# Patient Record
(demographics unavailable — no encounter records)

---

## 2017-12-28 NOTE — PCM.OPNOTE
- General Post-Op/Procedure Note


Date of Surgery/Procedure: 12/28/17


Operative Procedure(s): egd w bx.  colonoscopy w bx


Findings: 





see dict [id number]


Pre Op Diagnosis: hx of colon polyp and gerd


Post-Op Diagnosis: Same


Anesthesia Technique: Moderate Sedation


Primary Surgeon: Jose Cunningham


Pathology: 


egd bx


2 mm sessile polyp X 2, cold bx at icv


Complications: None


Condition: Good

## 2017-12-28 NOTE — OR
SURGEON:

Jose Cunningham MD

 

DATE OF PROCEDURE:  12/28/2017

 

PREOPERATIVE DIAGNOSIS:

Surveillance colonoscopy and acid reflux.

 

POSTOPERATIVE DIAGNOSIS:

Surveillance colonoscopy and acid reflux.

 

PROCEDURE PERFORMED:

1. Esophagogastroduodenoscopy with biopsy.

2. Colonoscopy with biopsy.

 

DESCRIPTION OF PROCEDURE:

EGD: The patient was taken to the endoscopy room, and with the CRNA, Diprivan

was administered.

 

A well-lubricated EGD scope was gently inserted through the oropharynx, down the

esophagus, passing through the gastroesophageal junction, into the stomach.  The

mucosa was examined upon the passage.  Any etiology will be noted.  Once in the

stomach, we continued to advance to the distal antrum, passed through the

pylorus into the second portion of the duodenum.  Again, the mucosa was examined

for any abnormality and etiology.

 

The scope was then retrieved back to the stomach and then retroflexed to look at

the fundus of the stomach.  If a biopsy was indicated, we will biopsy the

antrum, body, and gastroesophageal junction.  The air will be sucked out while

the scope is retrieved to reduce the patient's discomfort.

 

The patient tolerated the procedure well.  There were no intraoperative

complications.  Dr. Cunningham was present through the whole procedure.

 

Prior to surgery, a time-out had been called, the patient identified, procedure

identified and antibiotic administered.

 

Colonoscopy with biopsy:  The patient was taken to the endoscopy room.  A time

out was called, patient identified, and procedure identified.  Diprivan was then

administrated.  Patient went from awake to sleep, hearing doctor talking or door

closing is normal.  Perineum inspection and digital examination were then

performed.  A well-lubricated colonoscope was gently inserted through the

rectum, advanced past the rectosigmoid junction, the descending colon, splenic

flexure, transverse colon, hepatic flexure, ascending colon, arrived to the

cecum.  Cecum was identified as dictated in the finding.  Then the scope was

carefully withdrawn while attention was paid to the mucosal surface for any

abnormality.  Air will be sucked out during the scope withdrawal.  At the

rectum, retroflexed to examine any rectal diseases, fistula or hemorrhoids.

During mucosal examination, abnormality or polyp was noted; picture taken and

biopsy performed.  Patient tolerated procedure well.  There were no

intraoperative complications, and Dr. Cunningham was present throughout the whole

procedure.

 

EGD FINDINGS:

1. The patient was easily sedated with CRNA and Diprivan.  The patient was

    soundly snoring.

2. Oropharynx and proximal esophagus were free of disease and normal in

    appearance and distal esophagus, GE junction at 40 showed significant

    amount of salmon-color change consistent with acid reflux and stomach rugae

    is normal in appearance.  There is no bile, food, blood, or AV malformation

    or ulcer observed.  Antrum is mildly inflamed and duodenum is grossly

    normal in appearance.  Scope was retrieved back to the stomach to look at

    the fundus of stomach, the patient had mild degree hiatal hernia.  Again,

    no blood or ulcer observed on further study.  Biopsy done at antrum, GE

    junction at 40, and body and sucked out the air while scope pulling out.

 

COLONOSCOPY FINDINGS:

1. The patient was easily sedated with CRNA and Diprivan.  The patient was

    soundly snoring.

2. At this time, the patient does not seem to have any vasovagal response like

    last time.

3. Bowel prep is left to be desirable, large amount of opaque liquid stool

    coating the mucosa requiring constant irrigation and the stool is also

    opaque too.  No semi-formed stool.  The patient's cecum was indicated by

    ileocecal fold, one-to-one indentation, and light mittens and appendiceal

    orifice.  The mucosa examined upon scope pulling out with constant

    irrigation and again is a compromised study because of the bowel prep.  The

    patient had 2 tiny 2 mm sessile polyps at the ileocecal fold and were

    removed with biopsy forceps.  On continued examination. The patient had

    mild diverticulosis at the left colon.  No signs or symptoms of

    diverticulitis.  No inflammation, stricture, ulceration, bleeding, AV

    malformation or mass or growth observed.  The patient had mild external

    hemorrhoid and mild internal hemorrhoids.  The patient would benefit from

    repeat colonoscopy in 3 years from today or if clinically indicated,

    otherwise, if the pathology indicated.

 

 

CARRIE / NISSA

DD:  12/28/2017 14:51:33

DT:  12/28/2017 18:28:40

Job #:  [id number]

## 2017-12-28 NOTE — PCM.PREANE
Preanesthetic Assessment





- Procedure


Proposed Procedure: 





EGD/Colonoscopy








- Anesthesia/Transfusion/Family Hx


Anesthesia History: Prior Anesthesia Reaction


Other Type of Anesthesia Reaction Comment: vaso-vagal,  had bradycardia with 

last colonoscopy


Family History of Anesthesia Reaction: No


Transfusion History: No Prior Transfusion(s)


Intubation History: Unknown





- Review of Systems


General: No Symptoms


Pulmonary: Other (former smoker)


Cardiovascular: No Symptoms


Neurological: Other (recurrent neck pain)


Other: Reports: Depression





- Physical Assessment


NPO Status Date: 12/27/17


NPO Status Time: 22:00


O2 Sat by Pulse Oximetry: 95


Respiratory Rate: 16


Vital Signs: 





 Last Vital Signs











Temp  99.0 F   12/28/17 11:45


 


Pulse  86   12/28/17 11:45


 


Resp  16   12/28/17 11:45


 


BP  119/82   12/28/17 11:45


 


Pulse Ox  95   12/28/17 11:45











Height: 5 ft 10 in


Weight: 197 lb


ASA Class: 2


Mental Status: Alert & Oriented x3


Airway Class: Mallampati = 1


Dentition: Reports: Normal Dentition


Thyro-Mental Finger Breadths: 3


Mouth Opening Finger Breadths: 3


ROM/Head Extension: Limited/Partial


Lungs: Clear to Auscultation, Normal Respiratory Effort


Cardiovascular: Regular Rate, Regular Rhythm, No Murmurs





- Allergies


Allergies/Adverse Reactions: 


 Allergies











Allergy/AdvReac Type Severity Reaction Status Date / Time


 


No Known Allergies Allergy   Verified 12/26/17 11:11














- Blood


Blood Available: No


Product(s) Available: None





- Anesthesia Plan


Pre-Op Medication Ordered: None





- Acknowledgements


Anesthesia Type Planned: MAC


Pt an Appropriate Candidate for the Planned Anesthesia: Yes


Alternatives and Risks of Anesthesia Discussed w Pt/Guardian: Yes


Pt/Guardian Understands and Agrees with Anesthesia Plan: Yes





PreAnesthesia Questionnaire


HEENT History: Reports: Hard of Hearing


Other HEENT History: wears glasses


Musculoskeletal History: Reports: Back Pain, Chronic, Fracture, Neck Pain, 

Chronic


Other Musculoskeletal History: hx of fx ribs, sternum and hand


Psychiatric History: Reports: Depression





- Past Surgical History


GI Surgical History: Reports: Colonoscopy


Musculoskeletal Surgical History: Reports: ORIF


Other Musculoskeletal Surgeries/Procedures:: Hx of ORIF of hand- hardware 

removed





- SUBSTANCE USE


Smoking Status *Q: Former Smoker


Recreational Drug Use History: No





- HOME MEDS


Home Medications: 


 Home Meds





Cyclobenzaprine HCl 10 mg PO BEDTIME PRN 12/26/17 [History]


Diclofenac Sodium [Voltaren] 75 mg PO BID PRN 12/26/17 [History]


FLUoxetine [PROzac] 20 mg PO DAILY 12/26/17 [History]


buPROPion HCl [Wellbutrin Xl] 150 mg PO DAILY 12/26/17 [History]











- CURRENT (IN HOUSE) MEDS


Current Meds: 





 Current Medications





Lactated Ringer's (Ringers, Lactated)  1,000 mls @ 125 mls/hr IV ASDIRECTED JAKUB